# Patient Record
Sex: FEMALE | Race: OTHER
[De-identification: names, ages, dates, MRNs, and addresses within clinical notes are randomized per-mention and may not be internally consistent; named-entity substitution may affect disease eponyms.]

---

## 2023-10-26 ENCOUNTER — HOSPITAL ENCOUNTER (OUTPATIENT)
Facility: HOSPITAL | Age: 48
Setting detail: RECURRING SERIES
Discharge: HOME | End: 2023-10-29
Payer: COMMERCIAL

## 2023-10-26 PROCEDURE — 97162 PT EVAL MOD COMPLEX 30 MIN: CPT

## 2023-10-26 PROCEDURE — 97535 SELF CARE MNGMENT TRAINING: CPT

## 2023-10-26 PROCEDURE — 97110 THERAPEUTIC EXERCISES: CPT

## 2023-10-26 NOTE — THERAPY EVALUATION
[address] [facility] [employer] [facility] PHYSICAL THERAPY  [address] [address] [facility]  Phone: [phone]   [phone]  Plan of Care / Statement of Necessity for Physical Therapy Services     Patient Name: Jonathon Canavan : 1975   Medical   Diagnosis: Acute pain of right knee [M25.561] Treatment Diagnosis:   M25.561  RIGHT KNEE PAIN     Onset Date: 2023     Referral Source: Eun Domínguez Start of Care [facility]): 10/26/2023   Prior Hospitalization: See medical history Provider #: [id number]   Prior Level of Function: works full time, indep with all activity, able to ascend/descend stairs without complications, no rails. Biked for exercise   Comorbidities:  BMI > 30, pt denies any other significant PMH     Assessment / key information:    Pt is a 41y.o. year old  female with subjective complaints of R medial knee pain, weakness and loss of motion. TOM: Pt reports that in 2023, she was re-arranging her home in preparation for changes in the season, moving furniture and boxes of clothing, with insidious onset of R medial knee pain, loss of motion in the days following this activity. Pt denies any falls or specific injuries. Current pain is rated as 3-4/10; and described as sharp, stiff at the medial knee on the R. Pt reports intermittent buckling of the R knee with weight bearing. Current functional limitations: difficulty ascending/descending stairs, squatting, walking or standing for long periods as is required for work. Currently up to 4 hours with modifications in posture but needs to be able to stand for 8+ hours. Cont to work full time at [employer]. Better with: rest, medication  FOTO score= 49/100 indicating 51% impairment to functional activities. Today's evaulation is significant for:  Fall Risk Assessment: Does the patient have a fear of falling? n. If yes, what fall risk assessment was performed?   N/A    OBJECTIVE/EXAMINATION     Physical

## 2023-10-26 NOTE — PROGRESS NOTES
PHYSICAL / OCCUPATIONAL THERAPY - DAILY TREATMENT NOTE (updated )    Patient Name: Barbara Keller    Date: 10/26/2023    : 1975  Insurance: Payor: Alfonso Abreu / Plan: Keo Carney / Product Type: *No Product type* /      Patient  verified yes     Visit #   Current / Total 1 12   Time   In / Out 0901 0942   Pain   In / Out 4/10 4/10   Subjective Functional Status/Changes: Refer to POC     TREATMENT AREA =  Acute pain of right knee [M25.561]    OBJECTIVE      20 min []Eval - untimed                      Therapeutic Procedures: Tx Min Billable or 1:1 Min (if diff from Tx Min) Procedure, Rationale, Specifics   10  14412 Therapeutic Exercise (timed):  increase ROM, strength, coordination, balance, and proprioception to improve patient's ability to progress to PLOF and address remaining functional goals. (see flow sheet as applicable)     Details if applicable:     11  00980 Self Care/Home Management (timed):  improve patient knowledge and understanding of pain reducing techniques, positioning, posture/ergonomics, activity modification, diagnosis/prognosis, and physical therapy expectations, procedures and progression  to improve patient's ability to progress to PLOF and address remaining functional goals. (see flow sheet as applicable)     Details if applicable:  Edu on therapy findings, HEP, use of ice and elevation prn to reduce pain.            Details if applicable:            Details if applicable:            Details if applicable:     24  [facility] BC Totals Reminder: bill using total billable min of TIMED therapeutic procedures (example: do not include dry needle or estim unattended, both untimed codes, in totals to left)  8-22 min = 1 unit; 23-37 min = 2 units; 38-52 min = 3 units; 53-67 min = 4 units; 68-82 min = 5 units   Total Total     [x]  Patient Education billed concurrently with other procedures   [x] Review HEP    [] Progressed/Changed HEP, detail:    [] Other detail:       Objective

## 2023-10-31 ENCOUNTER — HOSPITAL ENCOUNTER (OUTPATIENT)
Facility: HOSPITAL | Age: 48
Setting detail: RECURRING SERIES
Discharge: HOME | End: 2023-11-03
Payer: COMMERCIAL

## 2023-10-31 PROCEDURE — 97530 THERAPEUTIC ACTIVITIES: CPT

## 2023-10-31 PROCEDURE — 97140 MANUAL THERAPY 1/> REGIONS: CPT

## 2023-10-31 NOTE — PROGRESS NOTES
PHYSICAL / OCCUPATIONAL THERAPY - DAILY TREATMENT NOTE (updated )    Patient Name: Celio Silva    Date: 10/31/2023    : 1975  Insurance: Payor: Shara Mcmahon / Plan: Nohelia Commander / Product Type: *No Product type* /      Patient  verified Yes     Visit #   Current / Total 2 12   Time   In / Out 11:00 11:49   Pain   In / Out 2/10 3/10   Subjective Functional Status/Changes: Pts reports mild soreness after IE. Compliant to HEP w/o issue. States she was on her feet for ~ 8 hours yesterday; has not iced \"I took tylenol and went to bed\"     TREATMENT AREA =  Acute pain of right knee [M25.561]    OBJECTIVE    Ice (UNBILLED):  location/position: to R knee in supine w/ towel roll under knee     Min Rationale   10 decrease edema, decrease inflammation, and decrease pain to improve patient's ability to progress to PLOF and address remaining functional goals. Skin assessment post-treatment:   Intact     Therapeutic Procedures:    43419 Therapeutic Exercise (timed):  increase ROM, strength, coordination, balance, and proprioception to improve patient's ability to progress to PLOF and address remaining functional goals. Tx Min Billable or 1:1 Min   (if diff from Tx Min) Details:   21  See flow sheet as applicable     77455 Manual Therapy (timed):  decrease pain, increase ROM, and increase tissue extensibility to improve patient's ability to progress to PLOF and address remaining functional goals. The manual therapy interventions were performed at a separate and distinct time from the therapeutic activities interventions .    Tx Min Billable or 1:1 Min   (if diff from Tx Min) Details:   8  STM to R RF, popliteus, distal medial HS; CFM to MCL     04260 Therapeutic Activity (timed):  use of dynamic activities replicating functional movements to increase ROM, strength, coordination, balance, and proprioception in order to improve patient's ability to progress to PLOF and address remaining functional

## 2023-11-01 ENCOUNTER — HOSPITAL ENCOUNTER (OUTPATIENT)
Facility: HOSPITAL | Age: 48
Setting detail: RECURRING SERIES
Discharge: HOME | End: 2023-11-04
Payer: COMMERCIAL

## 2023-11-01 PROCEDURE — 97110 THERAPEUTIC EXERCISES: CPT

## 2023-11-01 PROCEDURE — 97530 THERAPEUTIC ACTIVITIES: CPT

## 2023-11-01 PROCEDURE — 97140 MANUAL THERAPY 1/> REGIONS: CPT

## 2023-11-01 NOTE — PROGRESS NOTES
monitoring symptoms as tolerated. Patient will continue to benefit from skilled PT / OT services to modify and progress therapeutic interventions, analyze and address functional mobility deficits, analyze and address ROM deficits, analyze and address strength deficits, analyze and address soft tissue restrictions, analyze and cue for proper movement patterns, and instruct in home and community integration to address functional deficits and attain remaining goals. Progress toward goals / Updated goals:  []  See Progress Note/Recertification  Short Term Goals: To be accomplished in 2-3 weeks  1. Independent with HEP. EVAL: HEP issued  10/31/23: reports good compliance since IE  2. Decrease max pain 25-50% to assist with standing and performing transfers required at work. EVAL: pain ranges from 3-8/10. 3. Pt will have reduced swelling in the R knee by 1-2 cm to allow for greater ROM of the knee. Eval: R mid joint line of knee: 41 cm, L: 39 cm  4. Pt will have improved AROM of the R knee into ext to allow for improved gait mechanics. Eval: R knee ext: -10 deg  10/31: 0 deg of R knee ext w/ inc pain     Long Term Goals: To be accomplished in  4-6  weeks:  1. Decrease max pain 50-75% to assist with functional mobility. EVAL: pain 3-8/10 R knee  2. Improve FOTO Functional Status Score to 73/100 points in order to show significant functional improvement. EVAL: 49/100  3. Pt will have improved AROM of the R knee into ext of 0 deg to improve gait mechanics. EVAL:  -10 deg  4. Pt will have improved R SL squat to 60 deg in order to indicate improved LE strength. Eval: L: 60 deg, R: 35 deg  5. Pt will report ability to participate in work related activity x 8 hours without any increased pain > 3/10.    Eval: can tolerate up to 4 hours but has to modify posture/activity 2/2 R knee pain    Next PN/ RC due 11/26/23  Auth due ARACELI (50 v, exp 12/31)    PLAN  - Continue Plan of Care    Marely Jimenez, BRI

## 2023-11-07 ENCOUNTER — HOSPITAL ENCOUNTER (OUTPATIENT)
Facility: HOSPITAL | Age: 48
Setting detail: RECURRING SERIES
Discharge: HOME | End: 2023-11-10
Payer: COMMERCIAL

## 2023-11-07 PROCEDURE — 97140 MANUAL THERAPY 1/> REGIONS: CPT

## 2023-11-07 PROCEDURE — 97110 THERAPEUTIC EXERCISES: CPT

## 2023-11-07 PROCEDURE — 97530 THERAPEUTIC ACTIVITIES: CPT

## 2023-11-07 NOTE — PROGRESS NOTES
knee: 41 cm, L: 39 cm  4. Pt will have improved AROM of the R knee into ext to allow for improved gait mechanics. Eval: R knee ext: -10 deg  10/31: 0 deg of R knee ext w/ inc pain     Long Term Goals: To be accomplished in  4-6  weeks:  1. Decrease max pain 50-75% to assist with functional mobility. EVAL: pain 3-8/10 R knee  2. Improve FOTO Functional Status Score to 73/100 points in order to show significant functional improvement. EVAL: 49/100  3. Pt will have improved AROM of the R knee into ext of 0 deg to improve gait mechanics. EVAL:  -10 deg  4. Pt will have improved R SL squat to 60 deg in order to indicate improved LE strength. Eval: L: 60 deg, R: 35 deg  5. Pt will report ability to participate in work related activity x 8 hours without any increased pain > 3/10.    Eval: can tolerate up to 4 hours but has to modify posture/activity 2/2 R knee pain     Next PN/ RC due 11/26/23  Auth due ARACELI (50 v, exp 12/31)    PLAN  - Continue Plan of Care  - Upgrade activities as tolerated    Deirdre Chicas PTA    11/7/2023    9:00 AM    Future Appointments   Date Time Provider [address]   11/8/2023  9:00 AM Betzaida Irvin, PT [facility]   11/15/2023  9:00 AM Betzaida Irvin, PT [facility]   11/21/2023  9:00 AM Betzaida Irvin, PT [facility]   11/22/2023 11:00 AM Heladio Boyle PTA [facility]   11/28/2023  9:40 AM Margarita Trujillo, PT [facility]   11/29/2023  9:40 AM Betzaida Irvin, PT [facility]

## 2023-11-08 ENCOUNTER — HOSPITAL ENCOUNTER (OUTPATIENT)
Facility: HOSPITAL | Age: 48
Setting detail: RECURRING SERIES
Discharge: HOME | End: 2023-11-11
Payer: COMMERCIAL

## 2023-11-08 PROCEDURE — 97110 THERAPEUTIC EXERCISES: CPT

## 2023-11-08 PROCEDURE — 97530 THERAPEUTIC ACTIVITIES: CPT

## 2023-11-08 PROCEDURE — 97140 MANUAL THERAPY 1/> REGIONS: CPT

## 2023-11-08 NOTE — PROGRESS NOTES
performing transfers required at work. EVAL: pain ranges from 3-8/10. Current: 11/7/23 Progressing, pain ranges from 2-7/10  3. Pt will have reduced swelling in the R knee by 1-2 cm to allow for greater ROM of the knee. Eval: R mid joint line of knee: 41 cm, L: 39 cm  Current:   4. Pt will have improved AROM of the R knee into ext to allow for improved gait mechanics. Eval: R knee ext: -10 deg  Current: 10/31: 0 deg of R knee ext w/ inc pain     Long Term Goals: To be accomplished in  4-6  weeks:  1. Decrease max pain 50-75% to assist with functional mobility. EVAL: pain 3-8/10 R knee  Current:   2. Improve FOTO Functional Status Score to 73/100 points in order to show significant functional improvement. EVAL: 49/100  Current:   3. Pt will have improved AROM of the R knee into ext of 0 deg to improve gait mechanics. EVAL:  -10 deg  Current:   4. Pt will have improved R SL squat to 60 deg in order to indicate improved LE strength. Eval: L: 60 deg, R: 35 deg  Current:   5. Pt will report ability to participate in work related activity x 8 hours without any increased pain > 3/10.    Eval: can tolerate up to 4 hours but has to modify posture/activity 2/2 R knee pain  Current:      Next PN/ RC due 11/26/23  Auth due ARACELI (50 v, exp 12/31)    PLAN  - Continue Plan of Care  - Upgrade activities as tolerated    Tony Trinh, PT    11/8/2023    6:46 AM    Future Appointments   Date Time Provider [address]   11/8/2023  9:00 AM Isa Baer, PT [facility]   11/15/2023  9:00 AM Isa Baer PT [facility]   11/21/2023  9:00 AM Isa Baer PT [facility]   11/28/2023  9:40 AM Adriana Lucas PT [facility]   11/29/2023  9:40 AM Isa Baer, PT [facility]

## 2023-11-15 ENCOUNTER — HOSPITAL ENCOUNTER (OUTPATIENT)
Facility: HOSPITAL | Age: 48
Setting detail: RECURRING SERIES
End: 2023-11-15
Payer: COMMERCIAL

## 2023-11-21 ENCOUNTER — HOSPITAL ENCOUNTER (OUTPATIENT)
Facility: HOSPITAL | Age: 48
Setting detail: RECURRING SERIES
End: 2023-11-21
Payer: COMMERCIAL

## 2023-11-22 ENCOUNTER — APPOINTMENT (OUTPATIENT)
Facility: HOSPITAL | Age: 48
End: 2023-11-22
Payer: COMMERCIAL

## 2023-11-28 ENCOUNTER — HOSPITAL ENCOUNTER (OUTPATIENT)
Facility: HOSPITAL | Age: 48
Setting detail: RECURRING SERIES
Discharge: HOME | End: 2023-12-01
Payer: COMMERCIAL

## 2023-11-28 PROCEDURE — 97530 THERAPEUTIC ACTIVITIES: CPT

## 2023-11-28 NOTE — THERAPY DISCHARGE
improve gait mechanics. EVAL:  -10 deg  Current: 0 deg  Goal met? yes  4. Pt will have improved R SL squat to 60 deg in order to indicate improved LE strength. Eval: L: 60 deg, R: 35 deg  Current: L 80, R 82  Goal met? yes  5. Pt will report ability to participate in work related activity x 8 hours without any increased pain > 3/10. Eval: can tolerate up to 4 hours but has to modify posture/activity 2/2 R knee pain  Current: works 8 hours without accommodations; occasional pain following, no swelling  Goal met? yes    RECOMMENDATIONS  Pt has attended 6 sessions of PT from 10/26/23 - 11/28/2023, and has cancelled 2 sessions < 24 hours. Pt consistently arriving to PT sessions 10-20 minutes late. Pt had initially reported minimal compliance to HEP (~1x/week), however once importance of compliance reviewed, pt has been performing on average 1x/day and notes improvement overall. Pt subjectively reports 90% improvement with therapy and has returned to working her full shift without accommodations. Discontinue therapy. Progressing towards or have reached established goals. Discharge instructions provided to patient including continuing current HEP and what to do if symptoms return. Pt has our contact information to contact us if they have any future questions.       Luicla Claire, PT       11/28/2023       11:35 AM    Payor: Lorenza Carrier / Plan: SAUL TREJO [employer]ERAL / Product Type: *No Product type* /      Not Medicaid Ins, no signature required for DC

## 2023-11-29 ENCOUNTER — APPOINTMENT (OUTPATIENT)
Facility: HOSPITAL | Age: 48
End: 2023-11-29
Payer: COMMERCIAL